# Patient Record
Sex: MALE | Race: WHITE | HISPANIC OR LATINO | Employment: STUDENT | URBAN - METROPOLITAN AREA
[De-identification: names, ages, dates, MRNs, and addresses within clinical notes are randomized per-mention and may not be internally consistent; named-entity substitution may affect disease eponyms.]

---
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
## 2020-10-11 PROBLEM — H60.01: Status: RESOLVED | Noted: 2019-06-05 | Resolved: 2020-10-11

## 2020-10-13 ENCOUNTER — OFFICE VISIT (OUTPATIENT)
Dept: FAMILY MEDICINE CLINIC | Facility: CLINIC | Age: 20
End: 2020-10-13
Payer: COMMERCIAL

## 2020-10-13 VITALS
OXYGEN SATURATION: 96 % | HEIGHT: 68 IN | RESPIRATION RATE: 18 BRPM | BODY MASS INDEX: 23.82 KG/M2 | DIASTOLIC BLOOD PRESSURE: 74 MMHG | TEMPERATURE: 96 F | HEART RATE: 69 BPM | SYSTOLIC BLOOD PRESSURE: 110 MMHG | WEIGHT: 157.2 LBS

## 2020-10-13 DIAGNOSIS — Z13.89 SCREENING FOR CARDIOVASCULAR, RESPIRATORY, AND GENITOURINARY DISEASES: ICD-10-CM

## 2020-10-13 DIAGNOSIS — Z23 IMMUNIZATION DUE: ICD-10-CM

## 2020-10-13 DIAGNOSIS — Z13.1 SCREENING FOR DIABETES MELLITUS (DM): ICD-10-CM

## 2020-10-13 DIAGNOSIS — Z13.6 SCREENING FOR CARDIOVASCULAR, RESPIRATORY, AND GENITOURINARY DISEASES: ICD-10-CM

## 2020-10-13 DIAGNOSIS — Z00.00 HEALTHCARE MAINTENANCE: Primary | ICD-10-CM

## 2020-10-13 DIAGNOSIS — Z13.83 SCREENING FOR CARDIOVASCULAR, RESPIRATORY, AND GENITOURINARY DISEASES: ICD-10-CM

## 2020-10-13 PROCEDURE — 1036F TOBACCO NON-USER: CPT | Performed by: FAMILY MEDICINE

## 2020-10-13 PROCEDURE — 90651 9VHPV VACCINE 2/3 DOSE IM: CPT

## 2020-10-13 PROCEDURE — 3725F SCREEN DEPRESSION PERFORMED: CPT | Performed by: FAMILY MEDICINE

## 2020-10-13 PROCEDURE — 99395 PREV VISIT EST AGE 18-39: CPT | Performed by: FAMILY MEDICINE

## 2020-10-13 PROCEDURE — 90471 IMMUNIZATION ADMIN: CPT

## 2021-06-12 ENCOUNTER — OFFICE VISIT (OUTPATIENT)
Dept: FAMILY MEDICINE CLINIC | Facility: CLINIC | Age: 21
End: 2021-06-12
Payer: COMMERCIAL

## 2021-06-12 VITALS
RESPIRATION RATE: 16 BRPM | TEMPERATURE: 98 F | OXYGEN SATURATION: 99 % | BODY MASS INDEX: 25.16 KG/M2 | DIASTOLIC BLOOD PRESSURE: 72 MMHG | SYSTOLIC BLOOD PRESSURE: 118 MMHG | HEIGHT: 68 IN | HEART RATE: 86 BPM | WEIGHT: 166 LBS

## 2021-06-12 DIAGNOSIS — B34.9 VIRAL INFECTION, UNSPECIFIED: Primary | ICD-10-CM

## 2021-06-12 PROCEDURE — U0005 INFEC AGEN DETEC AMPLI PROBE: HCPCS | Performed by: FAMILY MEDICINE

## 2021-06-12 PROCEDURE — 1036F TOBACCO NON-USER: CPT | Performed by: FAMILY MEDICINE

## 2021-06-12 PROCEDURE — U0003 INFECTIOUS AGENT DETECTION BY NUCLEIC ACID (DNA OR RNA); SEVERE ACUTE RESPIRATORY SYNDROME CORONAVIRUS 2 (SARS-COV-2) (CORONAVIRUS DISEASE [COVID-19]), AMPLIFIED PROBE TECHNIQUE, MAKING USE OF HIGH THROUGHPUT TECHNOLOGIES AS DESCRIBED BY CMS-2020-01-R: HCPCS | Performed by: FAMILY MEDICINE

## 2021-06-12 PROCEDURE — 3725F SCREEN DEPRESSION PERFORMED: CPT | Performed by: FAMILY MEDICINE

## 2021-06-12 PROCEDURE — 99213 OFFICE O/P EST LOW 20 MIN: CPT | Performed by: FAMILY MEDICINE

## 2021-06-12 PROCEDURE — 3008F BODY MASS INDEX DOCD: CPT | Performed by: FAMILY MEDICINE

## 2021-06-12 NOTE — PROGRESS NOTES
Assessment/Plan:    1  Viral infection, unspecified  Comments:  has viral illness and no known exposure to COVID, will check to be sure  He is not vaccinated and works at [employer]  Orders:  -     Novel Coronavirus (Paul Samanoe 79 in Office    advised he needs to stay on quarantine until results available  COVID 19 Vaccine recommended, risks and benefits of vaccine discussed  There are no Patient Instructions on file for this visit  Return if symptoms worsen or fail to improve  Subjective:      Patient ID: Charmayne Begin is a 24 y o  male  Chief Complaint   Patient presents with    Cough     Runny Nose  mz cma        He has a runny nose, sneezing and coughing  It started 2-3 days ago  The following portions of the patient's history were reviewed and updated as appropriate:  past social history    Review of Systems   Constitutional: Negative for fever  Current Outpatient Medications   Medication Sig Dispense Refill    cholecalciferol (VITAMIN D3) 1,000 units tablet Take by mouth daily      Krill Oil 300 MG CAPS Take by mouth daily      Magnesium 250 MG TABS Take 1 tablet by mouth daily       No current facility-administered medications for this visit  Objective:    /72   Pulse 86   Temp 98 °F (36 7 °C)   Resp 16   Ht 5' 7 75" (1 721 m)   Wt 75 3 kg (166 lb)   SpO2 99%   BMI 25 43 kg/m²      Physical Exam  Vitals signs and nursing note reviewed  Constitutional:       Appearance: He is well-developed  HENT:      Head: Normocephalic and atraumatic  Right Ear: Tympanic membrane and external ear normal       Left Ear: Tympanic membrane and external ear normal    Cardiovascular:      Rate and Rhythm: Normal rate and regular rhythm  Heart sounds: Normal heart sounds  No murmur  Pulmonary:      Effort: Pulmonary effort is normal  No respiratory distress  Breath sounds: Normal breath sounds  No wheezing or rales  Musculoskeletal:      Right lower leg: No edema  Left lower leg: No edema               Jackie Sharif DO

## 2021-06-12 NOTE — LETTER
June 12, 2021     Patient: Lee Benjamin   YOB: 2000   Date of Visit: 6/12/2021       To Whom it May Concern:    Lee Benjamin is under my professional care  He was seen in my office on 6/12/2021  Please excuse from work on 6/14/21  If you have any questions or concerns, please don't hesitate to call           Sincerely,          Shankar Antunez DO        CC: No Recipients

## 2021-06-13 LAB — SARS-COV-2 RNA RESP QL NAA+PROBE: NEGATIVE

## 2021-06-14 ENCOUNTER — TELEPHONE (OUTPATIENT)
Dept: FAMILY MEDICINE CLINIC | Facility: CLINIC | Age: 21
End: 2021-06-14

## 2021-06-14 NOTE — TELEPHONE ENCOUNTER
----- Message from Fabien Jalloh DO sent at 6/14/2021  8:10 AM EDT -----  Please call patient to let them know that their COVID test is negative    Thank you,  Fabien Jalloh DO

## 2021-10-02 ENCOUNTER — TELEMEDICINE (OUTPATIENT)
Dept: FAMILY MEDICINE CLINIC | Facility: CLINIC | Age: 21
End: 2021-10-02
Payer: COMMERCIAL

## 2021-10-02 DIAGNOSIS — Z20.822 EXPOSURE TO COVID-19 VIRUS: Primary | ICD-10-CM

## 2021-10-02 PROCEDURE — U0005 INFEC AGEN DETEC AMPLI PROBE: HCPCS | Performed by: FAMILY MEDICINE

## 2021-10-02 PROCEDURE — U0003 INFECTIOUS AGENT DETECTION BY NUCLEIC ACID (DNA OR RNA); SEVERE ACUTE RESPIRATORY SYNDROME CORONAVIRUS 2 (SARS-COV-2) (CORONAVIRUS DISEASE [COVID-19]), AMPLIFIED PROBE TECHNIQUE, MAKING USE OF HIGH THROUGHPUT TECHNOLOGIES AS DESCRIBED BY CMS-2020-01-R: HCPCS | Performed by: FAMILY MEDICINE

## 2021-10-02 PROCEDURE — 99213 OFFICE O/P EST LOW 20 MIN: CPT | Performed by: FAMILY MEDICINE

## 2021-10-02 PROCEDURE — 1036F TOBACCO NON-USER: CPT | Performed by: FAMILY MEDICINE

## 2021-10-04 ENCOUNTER — TELEPHONE (OUTPATIENT)
Dept: FAMILY MEDICINE CLINIC | Facility: CLINIC | Age: 21
End: 2021-10-04

## 2022-12-06 ENCOUNTER — OFFICE VISIT (OUTPATIENT)
Dept: URGENT CARE | Facility: CLINIC | Age: 22
End: 2022-12-06

## 2022-12-06 VITALS
HEIGHT: 68 IN | BODY MASS INDEX: 27.74 KG/M2 | RESPIRATION RATE: 18 BRPM | HEART RATE: 78 BPM | TEMPERATURE: 98.6 F | WEIGHT: 183 LBS | OXYGEN SATURATION: 98 %

## 2022-12-06 DIAGNOSIS — R68.89 FLU-LIKE SYMPTOMS: Primary | ICD-10-CM

## 2022-12-06 NOTE — LETTER
[facility]  [address] [place] [id number] [phone]  Dept: [phone]    December 6, 2022    Patient: Jorge Wallace  YOB: 2000    Jorge Wallace was seen and evaluated at our [facility]  Please note if Covid and Flu tests are negative, they may return to work when fever free for 24 hours without the use of a fever reducing agent  If Covid or Flu test is positive, they may return to work on 12/08/2022, as this is 5 days from the onset of symptoms  Upon return, they must then adhere to strict masking for an additional 5 days      Sincerely,    Derrick Morgan PA-C

## 2022-12-06 NOTE — PROGRESS NOTES
[facility]        NAME: Yas Tran is a 25 y o  male  : 2000    MRN: [id number]  DATE: 2022  TIME: 12:02 PM    Assessment and Plan   Flu-like symptoms [R68 89]  1  Flu-like symptoms  Covid/Flu-Office Collect            Patient Instructions     Patient Instructions   COVID/flu swab performed, results to be in 24 to 48 hours  Recommend continue over-the-counter cough and cold medication, adequate fluid hydration and rest   Discussed isolation/quarantine requirements  Follow up with PCP in 3-5 days  Proceed to  ER if symptoms worsen  Chief Complaint     Chief Complaint   Patient presents with   • Cold Like Symptoms     Coughing, sneezing ,vomited sore throat         History of Present Illness       Patient is a 26-year-old male presenting today with flulike symptoms x3 days  Patient notes of the last few days he has been experiencing some congestion and a cough, reports yesterday he had a couple episodes of emesis and some body aches, is feeling slightly better today compared to onset of initial symptoms, has been taking occasional over-the-counter cold medication which has provided some relief  Notes that he has been around a few individuals who tested positive for the flu  Denies fever, chills, abdominal pain, trouble swallowing, chest tightness, SOB  Review of Systems   Review of Systems   Constitutional: Negative for chills and fever  HENT: Positive for congestion, sinus pressure and sore throat  Negative for ear pain and trouble swallowing  Eyes: Negative for pain  Respiratory: Positive for cough  Negative for chest tightness and shortness of breath  Cardiovascular: Negative for chest pain  Gastrointestinal: Negative for abdominal pain  Musculoskeletal: Positive for myalgias  Skin: Negative for rash  Neurological: Negative for headaches           Current Medications       Current Outpatient Medications:   •  cholecalciferol (VITAMIN D3) 1,000 units tablet, Take by mouth daily (Patient not taking: Reported on 12/6/2022), Disp: , Rfl:   •  Krill Oil 300 MG CAPS, Take by mouth daily (Patient not taking: Reported on 12/6/2022), Disp: , Rfl:   •  Magnesium 250 MG TABS, Take 1 tablet by mouth daily (Patient not taking: Reported on 12/6/2022), Disp: , Rfl:     Current Allergies     Allergies as of 12/06/2022   • (No Known Allergies)            The following portions of the patient's history were reviewed and updated as appropriate: allergies, current medications, past family history, past medical history, past social history, past surgical history and problem list      History reviewed  No pertinent past medical history  History reviewed  No pertinent surgical history  Family History   Problem Relation Age of Onset   • Prostate cancer Family    • No Known Problems Father    • No Known Problems Mother          Medications have been verified  Objective   Pulse 78   Temp 98 6 °F (37 °C)   Resp 18   Ht 5' 8" (1 727 m)   Wt 83 kg (183 lb)   SpO2 98%   BMI 27 83 kg/m²        Physical Exam     Physical Exam  Vitals and nursing note reviewed  Constitutional:       General: He is not in acute distress  Appearance: Normal appearance  He is well-developed  He is not toxic-appearing  HENT:      Head: Normocephalic and atraumatic  Right Ear: Tympanic membrane, ear canal and external ear normal       Left Ear: Tympanic membrane, ear canal and external ear normal       Nose: Congestion present  Mouth/Throat:      Mouth: Mucous membranes are moist       Pharynx: Oropharynx is clear  No oropharyngeal exudate or posterior oropharyngeal erythema  Eyes:      Conjunctiva/sclera: Conjunctivae normal    Cardiovascular:      Rate and Rhythm: Normal rate and regular rhythm  Pulses: Normal pulses  Pulmonary:      Effort: Pulmonary effort is normal       Breath sounds: Normal breath sounds     Musculoskeletal:      Cervical back: Normal range of motion  No tenderness  Lymphadenopathy:      Cervical: No cervical adenopathy  Skin:     General: Skin is warm  Capillary Refill: Capillary refill takes less than 2 seconds  Neurological:      General: No focal deficit present  Mental Status: He is alert and oriented to person, place, and time

## 2022-12-06 NOTE — PATIENT INSTRUCTIONS
COVID/flu swab performed, results to be in 24 to 48 hours  Recommend continue over-the-counter cough and cold medication, adequate fluid hydration and rest   Discussed isolation/quarantine requirements

## 2022-12-07 ENCOUNTER — TELEPHONE (OUTPATIENT)
Dept: URGENT CARE | Facility: CLINIC | Age: 22
End: 2022-12-07

## 2022-12-07 LAB
FLUAV RNA RESP QL NAA+PROBE: POSITIVE
FLUBV RNA RESP QL NAA+PROBE: NEGATIVE
SARS-COV-2 RNA RESP QL NAA+PROBE: NEGATIVE

## 2022-12-07 NOTE — TELEPHONE ENCOUNTER
Patient informed of positive fluid results, recommended continuing supportive care, patient states he is feeling much better, reiterated isolation/quarantine requirements  All patient's questions answered and is agreeable with this plan

## 2023-11-17 ENCOUNTER — OFFICE VISIT (OUTPATIENT)
Dept: FAMILY MEDICINE CLINIC | Facility: CLINIC | Age: 23
End: 2023-11-17
Payer: COMMERCIAL

## 2023-11-17 VITALS
HEART RATE: 95 BPM | BODY MASS INDEX: 29.7 KG/M2 | SYSTOLIC BLOOD PRESSURE: 128 MMHG | DIASTOLIC BLOOD PRESSURE: 72 MMHG | HEIGHT: 68 IN | WEIGHT: 196 LBS | TEMPERATURE: 97.8 F | RESPIRATION RATE: 18 BRPM

## 2023-11-17 DIAGNOSIS — Z13.83 SCREENING FOR CARDIOVASCULAR, RESPIRATORY, AND GENITOURINARY DISEASES: ICD-10-CM

## 2023-11-17 DIAGNOSIS — Z23 IMMUNIZATION DUE: ICD-10-CM

## 2023-11-17 DIAGNOSIS — Z00.00 HEALTHCARE MAINTENANCE: Primary | ICD-10-CM

## 2023-11-17 DIAGNOSIS — Z13.1 SCREENING FOR DIABETES MELLITUS (DM): ICD-10-CM

## 2023-11-17 DIAGNOSIS — Z13.6 SCREENING FOR CARDIOVASCULAR, RESPIRATORY, AND GENITOURINARY DISEASES: ICD-10-CM

## 2023-11-17 DIAGNOSIS — Z13.89 SCREENING FOR CARDIOVASCULAR, RESPIRATORY, AND GENITOURINARY DISEASES: ICD-10-CM

## 2023-11-17 PROCEDURE — 90715 TDAP VACCINE 7 YRS/> IM: CPT

## 2023-11-17 PROCEDURE — 99395 PREV VISIT EST AGE 18-39: CPT | Performed by: FAMILY MEDICINE

## 2023-11-17 PROCEDURE — 90471 IMMUNIZATION ADMIN: CPT

## 2023-11-17 RX ORDER — DIPHENOXYLATE HYDROCHLORIDE AND ATROPINE SULFATE 2.5; .025 MG/1; MG/1
1 TABLET ORAL DAILY
COMMUNITY

## 2023-11-17 NOTE — PROGRESS NOTES
Assessment/Plan:    No problem-specific Assessment & Plan notes found for this encounter. Cpe  Screen labs  Healthy diet and habits discussed    Nonspecific itchiness lower legs, years  suggest oral H1b, aveeno lotion, hypoallergenic detergents, prevent dryness, f/u if no better vs dermatology    You can try a daily anti-histamine such as claritin, zyrtec, allegra or xyzal every day to see if the leg itching issues improve. Diagnoses and all orders for this visit:    Healthcare maintenance    Screening for diabetes mellitus (DM)  -     Comprehensive metabolic panel; Future    Screening for cardiovascular, respiratory, and genitourinary diseases  -     Lipid Panel with Direct LDL reflex; Future    Immunization due  -     TDAP VACCINE GREATER THAN OR EQUAL TO 6YO IM    Other orders  -     multivitamin (THERAGRAN) TABS; Take 1 tablet by mouth daily        Return if symptoms worsen or fail to improve. Subjective:      Patient ID: Kimberly Dior is a 21 y.o. male. Chief Complaint   Patient presents with    Physical Exam     Sas/cma       HPI  Working in [employer] for now  [employer] school  Exercising   Eats healthy  Likes his portions  Not much sugar intake  Some wt gain  Drinks water  Goes to gym about 2-3w    Years  Legs itch  Lower legs  Not arms  No pets  No exposures  Not sure if rashes are self inflicted\    The following portions of the patient's history were reviewed and updated as appropriate: allergies, current medications, past family history, past medical history, past social history, past surgical history and problem list.    Review of Systems   Constitutional:  Negative for chills and fever. Current Outpatient Medications   Medication Sig Dispense Refill    multivitamin (THERAGRAN) TABS Take 1 tablet by mouth daily       No current facility-administered medications for this visit.        Objective:    /72   Pulse 95   Temp 97.8 °F (36.6 °C)   Resp 18   Ht 5' 8" (1.727 m)   Wt 88.9 kg (196 lb)   BMI 29.80 kg/m²        Physical Exam  Vitals and nursing note reviewed. Constitutional:       General: He is not in acute distress. Appearance: He is well-developed. He is not ill-appearing. HENT:      Head: Normocephalic. Right Ear: Tympanic membrane and ear canal normal.      Left Ear: Tympanic membrane and ear canal normal.      Nose: No congestion. Eyes:      General: No scleral icterus. Conjunctiva/sclera: Conjunctivae normal.   Neck:      Vascular: No carotid bruit. Cardiovascular:      Rate and Rhythm: Normal rate and regular rhythm. Heart sounds: No murmur heard. Pulmonary:      Effort: Pulmonary effort is normal. No respiratory distress. Breath sounds: No wheezing. Abdominal:      General: There is no distension. Palpations: Abdomen is soft. Tenderness: There is no abdominal tenderness. Hernia: No hernia is present. Genitourinary:     Penis: Normal.       Testes: Normal.   Musculoskeletal:         General: No deformity. Cervical back: Neck supple. Right lower leg: No edema. Left lower leg: No edema. Lymphadenopathy:      Cervical: No cervical adenopathy. Skin:     General: Skin is warm and dry. Coloration: Skin is not jaundiced or pale. Findings: No erythema, lesion or rash. Comments: Some excoriations lower legs   Neurological:      Mental Status: He is alert. Motor: No weakness. Gait: Gait normal.   Psychiatric:         Mood and Affect: Mood normal.         Behavior: Behavior normal.         Thought Content: Thought content normal.       BMI Counseling: Body mass index is 29.8 kg/m². The BMI is above normal. Nutrition recommendations include decreasing portion sizes and moderation in carbohydrate intake. Exercise recommendations include exercising 3-5 times per week. No pharmacotherapy was ordered. Rationale for BMI follow-up plan is due to patient being overweight or obese.      Depression Screening and Follow-up Plan: Patient was screened for depression during today's encounter. They screened negative with a PHQ-2 score of 0.              Ruben Gastelum DO

## 2023-11-17 NOTE — PATIENT INSTRUCTIONS
You can try a daily anti-histamine such as claritin, zyrtec, allegra or xyzal every day to see if the leg itching issues improve.

## 2023-11-26 ENCOUNTER — OFFICE VISIT (OUTPATIENT)
Dept: URGENT CARE | Facility: CLINIC | Age: 23
End: 2023-11-26
Payer: COMMERCIAL

## 2023-11-26 VITALS
TEMPERATURE: 98.5 F | DIASTOLIC BLOOD PRESSURE: 78 MMHG | WEIGHT: 200 LBS | OXYGEN SATURATION: 99 % | SYSTOLIC BLOOD PRESSURE: 135 MMHG | HEIGHT: 68 IN | HEART RATE: 85 BPM | BODY MASS INDEX: 30.31 KG/M2

## 2023-11-26 DIAGNOSIS — J06.9 ACUTE URI: Primary | ICD-10-CM

## 2023-11-26 LAB
SARS-COV-2 AG UPPER RESP QL IA: NEGATIVE
VALID CONTROL: NORMAL

## 2023-11-26 PROCEDURE — 87811 SARS-COV-2 COVID19 W/OPTIC: CPT | Performed by: PHYSICIAN ASSISTANT

## 2023-11-26 PROCEDURE — 99213 OFFICE O/P EST LOW 20 MIN: CPT | Performed by: PHYSICIAN ASSISTANT

## 2023-11-26 NOTE — PATIENT INSTRUCTIONS
1. Over-the-counter ibuprofen and/or acetaminophen as needed for pain or fever. 2. Oxymetazoline nasal spray 2 sprays in each nostril every 12 hours for no more than the next 5 days as needed for nasal congestion. 3. Over-the-counter guaifenesin as needed for mucus relief. 4. Gargle salt water as needed for sore throat relief. 5. Increase oral fluid consumption. 6. Follow-up with primary care provider in 7 days for any persistent symptoms. 7.  Go to the ER immediately for any significantly worsening symptoms.

## 2023-11-26 NOTE — PROGRESS NOTES
[facility]        NAME: Wilfred Vences is a 21 y.o. male  : 2000    MRN: [id number]  DATE: 2023  TIME: 3:38 PM    Assessment and Plan   Acute URI [J06.9]  1. Acute URI  Poct Covid 19 Rapid Antigen Test            Patient Instructions     1. Over-the-counter ibuprofen and/or acetaminophen as needed for pain or fever. 2. Oxymetazoline nasal spray 2 sprays in each nostril every 12 hours for no more than the next 5 days as needed for nasal congestion. 3. Over-the-counter guaifenesin as needed for mucus relief. 4. Gargle salt water as needed for sore throat relief. 5. Increase oral fluid consumption. 6. Follow-up with primary care provider in 7 days for any persistent symptoms. 7.  Go to the ER immediately for any significantly worsening symptoms. Chief Complaint     Chief Complaint   Patient presents with    Cold Like Symptoms    Cough     Pt is also complaining of sinus congestion. Symptoms started 2 days ago. History of Present Illness       26-year-old male patient with a 2-day history of nasal congestion, runny nose, mild sore throat, cough. Patient does have fatigue. No fever or chills. No headache. Patient does complain of sinus pressure. Patient is concerned about COVID and would like to be tested. Cough  Associated symptoms include rhinorrhea. Pertinent negatives include no chills or fever. Review of Systems   Review of Systems   Constitutional:  Positive for fatigue. Negative for chills and fever. HENT:  Positive for congestion and rhinorrhea. Respiratory:  Positive for cough.           Current Medications       Current Outpatient Medications:     multivitamin (THERAGRAN) TABS, Take 1 tablet by mouth daily, Disp: , Rfl:     Current Allergies     Allergies as of 2023    (No Known Allergies)            The following portions of the patient's history were reviewed and updated as appropriate: allergies, current medications, past family history, past medical history, past social history, past surgical history and problem list.     History reviewed. No pertinent past medical history. History reviewed. No pertinent surgical history. Family History   Problem Relation Age of Onset    No Known Problems Mother     No Known Problems Father     Prostate cancer Family          Medications have been verified. Objective   /78   Pulse 85   Temp 98.5 °F (36.9 °C)   Ht 5' 8" (1.727 m)   Wt 90.7 kg (200 lb)   SpO2 99%   BMI 30.41 kg/m²        Physical Exam     Physical Exam  Vitals and nursing note reviewed. Constitutional:       General: He is not in acute distress. Appearance: Normal appearance. He is not ill-appearing. HENT:      Head: Normocephalic. Right Ear: Tympanic membrane normal.      Left Ear: Tympanic membrane normal.      Nose: Congestion present. No rhinorrhea. Mouth/Throat:      Mouth: Mucous membranes are moist.      Pharynx: Oropharynx is clear. Eyes:      Conjunctiva/sclera: Conjunctivae normal.      Pupils: Pupils are equal, round, and reactive to light. Cardiovascular:      Rate and Rhythm: Normal rate and regular rhythm. Pulses: Normal pulses. Pulmonary:      Effort: Pulmonary effort is normal.      Breath sounds: Normal breath sounds. Abdominal:      Tenderness: There is no abdominal tenderness. Musculoskeletal:         General: Normal range of motion. Cervical back: Normal range of motion and neck supple. Skin:     General: Skin is warm and dry. Capillary Refill: Capillary refill takes less than 2 seconds. Neurological:      Mental Status: He is alert and oriented to person, place, and time.    Psychiatric:         Mood and Affect: Mood normal.         Behavior: Behavior normal.

## 2024-02-21 PROBLEM — Z13.6 SCREENING FOR CARDIOVASCULAR, RESPIRATORY, AND GENITOURINARY DISEASES: Status: RESOLVED | Noted: 2018-09-28 | Resolved: 2024-02-21

## 2024-02-21 PROBLEM — Z13.89 SCREENING FOR CARDIOVASCULAR, RESPIRATORY, AND GENITOURINARY DISEASES: Status: RESOLVED | Noted: 2018-09-28 | Resolved: 2024-02-21

## 2024-02-21 PROBLEM — Z13.1 SCREENING FOR DIABETES MELLITUS (DM): Status: RESOLVED | Noted: 2018-09-28 | Resolved: 2024-02-21

## 2024-02-21 PROBLEM — Z13.83 SCREENING FOR CARDIOVASCULAR, RESPIRATORY, AND GENITOURINARY DISEASES: Status: RESOLVED | Noted: 2018-09-28 | Resolved: 2024-02-21

## 2024-02-21 PROBLEM — Z00.00 HEALTHCARE MAINTENANCE: Status: RESOLVED | Noted: 2018-09-28 | Resolved: 2024-02-21

## 2025-03-03 ENCOUNTER — OFFICE VISIT (OUTPATIENT)
Dept: FAMILY MEDICINE CLINIC | Facility: CLINIC | Age: 25
End: 2025-03-03
Payer: COMMERCIAL

## 2025-03-03 VITALS
HEART RATE: 72 BPM | BODY MASS INDEX: 29.95 KG/M2 | DIASTOLIC BLOOD PRESSURE: 76 MMHG | RESPIRATION RATE: 18 BRPM | SYSTOLIC BLOOD PRESSURE: 132 MMHG | HEIGHT: 69 IN | WEIGHT: 202.2 LBS | TEMPERATURE: 98.7 F

## 2025-03-03 DIAGNOSIS — Z13.6 SCREENING FOR CARDIOVASCULAR, RESPIRATORY, AND GENITOURINARY DISEASES: ICD-10-CM

## 2025-03-03 DIAGNOSIS — E66.9 OBESITY (BMI 30-39.9): ICD-10-CM

## 2025-03-03 DIAGNOSIS — R35.0 URINARY FREQUENCY: ICD-10-CM

## 2025-03-03 DIAGNOSIS — L74.8 FOOT ODOR: ICD-10-CM

## 2025-03-03 DIAGNOSIS — Z13.1 SCREENING FOR DIABETES MELLITUS (DM): ICD-10-CM

## 2025-03-03 DIAGNOSIS — Z13.89 SCREENING FOR CARDIOVASCULAR, RESPIRATORY, AND GENITOURINARY DISEASES: ICD-10-CM

## 2025-03-03 DIAGNOSIS — Z13.29 SCREENING FOR THYROID DISORDER: ICD-10-CM

## 2025-03-03 DIAGNOSIS — Z00.00 HEALTHCARE MAINTENANCE: Primary | ICD-10-CM

## 2025-03-03 DIAGNOSIS — Z13.83 SCREENING FOR CARDIOVASCULAR, RESPIRATORY, AND GENITOURINARY DISEASES: ICD-10-CM

## 2025-03-03 PROCEDURE — 99395 PREV VISIT EST AGE 18-39: CPT | Performed by: FAMILY MEDICINE

## 2025-03-03 NOTE — PROGRESS NOTES
"Name: Amena Underwood      : 2000      MRN: [id number]  Encounter Provider: Anthony Zuniga DO  Encounter Date: 3/3/2025   Encounter department: [facility]  :  Assessment & Plan  Healthcare maintenance  Labs, diet, exercise, wt loss       Screening for cardiovascular, respiratory, and genitourinary diseases  labs  Orders:    Lipid Panel with Direct LDL reflex; Future    Screening for diabetes mellitus (DM)  labs  Orders:    Comprehensive metabolic panel; Future    Screening for thyroid disorder  labs  Orders:    TSH, 3rd generation; Future    Urinary frequency  Bladder outlet issue?  Avoid stimulants, caffeine, antihistamines  Possible urology next  Orders:    US bladder with post void residual; Future    Foot odor  No rash  Trial wash with hibiclens after shower       Obesity (BMI 30-39.9)           BMI 30.0-30.9,adult              History of Present Illness   HPI  In school, associates program in [employer]  Thinking [facility] next  Part time work at [employer]  Eating healthy  Gym but not consistently going  Water 32oz/d    Notes more feet odor lately  Washes them well  Notes at end of day  No rashes or itc  Not whole body    Gained about 50# in past 5y    Feels he has urine freq   No caffeine or H1b or sudafed  Hesitancy per pt  About 1 year of symptoms  No burning  Feels need to urinate shortly after going    Review of Systems   Constitutional:  Positive for unexpected weight change. Negative for chills and fever.   Respiratory:  Negative for shortness of breath.    Cardiovascular:  Negative for chest pain.   Gastrointestinal:  Negative for abdominal pain.       Objective   /76   Pulse 72   Temp 98.7 °F (37.1 °C)   Resp 18   Ht 5' 8.5\" (1.74 m)   Wt 91.7 kg (202 lb 3.2 oz)   BMI 30.30 kg/m²      Physical Exam  Vitals and nursing note reviewed.   Constitutional:       General: He is not in acute distress.     Appearance: He is well-developed. He is not ill-appearing.   HENT:      Head: " Normocephalic.      Right Ear: Tympanic membrane and ear canal normal.      Left Ear: Tympanic membrane and ear canal normal.      Nose: No congestion.   Eyes:      General: No scleral icterus.     Conjunctiva/sclera: Conjunctivae normal.   Cardiovascular:      Rate and Rhythm: Normal rate and regular rhythm.      Heart sounds: No murmur heard.  Pulmonary:      Effort: Pulmonary effort is normal. No respiratory distress.      Breath sounds: No wheezing.   Abdominal:      General: There is no distension.      Palpations: Abdomen is soft.      Tenderness: There is no abdominal tenderness. There is no guarding or rebound.      Hernia: No hernia is present.   Genitourinary:     Penis: Normal.       Testes: Normal.   Musculoskeletal:         General: No deformity.      Cervical back: Neck supple.      Right lower leg: No edema.      Left lower leg: No edema.   Skin:     General: Skin is warm and dry.      Coloration: Skin is not jaundiced or pale.   Neurological:      Mental Status: He is alert.      Motor: No weakness.      Gait: Gait normal.   Psychiatric:         Mood and Affect: Mood normal.         Behavior: Behavior normal.         Thought Content: Thought content normal.

## 2025-03-04 NOTE — ASSESSMENT & PLAN NOTE
Bladder outlet issue?  Avoid stimulants, caffeine, antihistamines  Possible urology next  Orders:    US bladder with post void residual; Future

## 2025-07-03 ENCOUNTER — OFFICE VISIT (OUTPATIENT)
Dept: URGENT CARE | Facility: CLINIC | Age: 25
End: 2025-07-03
Payer: COMMERCIAL

## 2025-07-03 VITALS
HEIGHT: 68 IN | SYSTOLIC BLOOD PRESSURE: 124 MMHG | OXYGEN SATURATION: 97 % | HEART RATE: 88 BPM | DIASTOLIC BLOOD PRESSURE: 74 MMHG | TEMPERATURE: 97.5 F | BODY MASS INDEX: 30.31 KG/M2 | RESPIRATION RATE: 18 BRPM | WEIGHT: 200 LBS

## 2025-07-03 DIAGNOSIS — J06.9 VIRAL UPPER RESPIRATORY ILLNESS: Primary | ICD-10-CM

## 2025-07-03 PROCEDURE — 99213 OFFICE O/P EST LOW 20 MIN: CPT

## 2025-07-03 RX ORDER — BENZONATATE 200 MG/1
200 CAPSULE ORAL 3 TIMES DAILY PRN
Qty: 20 CAPSULE | Refills: 0 | Status: SHIPPED | OUTPATIENT
Start: 2025-07-03

## 2025-07-03 RX ORDER — ALBUTEROL SULFATE 90 UG/1
2 INHALANT RESPIRATORY (INHALATION) EVERY 6 HOURS PRN
Qty: 8.5 G | Refills: 0 | Status: SHIPPED | OUTPATIENT
Start: 2025-07-03

## 2025-07-03 NOTE — PROGRESS NOTES
[facility]  Name: Amena Underwood      : 2000      MRN: [id number]  Encounter Provider: MATHEW Mitchell  Encounter Date: 7/3/2025   Encounter department: [facility]  :  Assessment & Plan  Viral upper respiratory illness  Recommend Mucinex during the day as needed for cough.  Recommend Flonase daily.  Trial Tessalon every 8 hours as needed for cough.  Trial albuterol every 6 hours as needed for chest tightness and shortness of breath.           Patient Instructions  Follow up with PCP in 3-5 days.  Proceed to  ER if symptoms worsen.    If tests are performed, our office will contact you with results only if changes need to made to the care plan discussed with you at the visit. You can review your full results on [facility].    Chief Complaint:   Chief Complaint   Patient presents with    Cough     Cough and congestion for about a week. Now has chest pain when coughing. Taking otc cough and cold medicine     History of Present Illness   25-year-old male presents for evaluation of cold symptoms.  Over the past 4 days patient has been experiencing congestion, sore throat, cough, chest tightness and chest pain.  He notes that the chest pain and back pain with coughing started earlier today.  He has been using over-the-counter cough and cold medication with minimal relief.    Cough  Associated symptoms include chest pain and a sore throat. Pertinent negatives include no chills, fever or shortness of breath.         Review of Systems   Constitutional:  Negative for chills and fever.   HENT:  Positive for congestion and sore throat.    Respiratory:  Positive for cough and chest tightness. Negative for shortness of breath.    Cardiovascular:  Positive for chest pain.   Gastrointestinal:  Negative for diarrhea, nausea and vomiting.     Past Medical History   Past Medical History[1]  Past Surgical History[2]  Family History[3]  he reports that he has never smoked. He has  "never been exposed to tobacco smoke. He has never used smokeless tobacco. He reports current alcohol use. He reports that he does not use drugs.  Current Outpatient Medications   Medication Instructions    multivitamin (THERAGRAN) TABS 1 tablet, Daily   Allergies[4]     Objective   /74   Pulse 88   Temp 97.5 °F (36.4 °C)   Resp 18   Ht 5' 8\" (1.727 m)   Wt 90.7 kg (200 lb)   SpO2 97%   BMI 30.41 kg/m²      Physical Exam  Vitals and nursing note reviewed.   Constitutional:       General: He is not in acute distress.     Appearance: He is well-developed.   HENT:      Head: Normocephalic and atraumatic.      Nose: Congestion present.      Mouth/Throat:      Pharynx: Posterior oropharyngeal erythema present.     Eyes:      Conjunctiva/sclera: Conjunctivae normal.       Cardiovascular:      Rate and Rhythm: Normal rate and regular rhythm.      Heart sounds: No murmur heard.  Pulmonary:      Effort: Pulmonary effort is normal. No respiratory distress.      Breath sounds: Normal breath sounds.   Abdominal:      Palpations: Abdomen is soft.      Tenderness: There is no abdominal tenderness.     Musculoskeletal:         General: No swelling.      Cervical back: Neck supple.     Skin:     General: Skin is warm and dry.      Capillary Refill: Capillary refill takes less than 2 seconds.     Neurological:      Mental Status: He is alert.     Psychiatric:         Mood and Affect: Mood normal.         Portions of the record may have been created with voice recognition software.  Occasional wrong word or \"sound a like\" substitutions may have occurred due to the inherent limitations of voice recognition software.  Read the chart carefully and recognize, using context, where substitutions have occurred.         [1] No past medical history on file.  [2] No past surgical history on file.  [3]   Family History  Problem Relation Name Age of Onset    No Known Problems Mother      No Known Problems Father      Prostate cancer " Family Unspec    [4] No Known Allergies

## 2025-07-03 NOTE — LETTER
July 3, 2025     Patient: Amena Underwood   YOB: 2000   Date of Visit: 7/3/2025       To Whom it May Concern:    Amena Underwood was seen in my clinic on 7/3/2025. He may be excused from work on 7/3/2025.    If you have any questions or concerns, please don't hesitate to call.         Sincerely,          MATHEW Mitchell        CC: No Recipients

## 2025-07-03 NOTE — PATIENT INSTRUCTIONS
Recommend Mucinex during the day as needed for cough.  Recommend Flonase daily.  Trial Tessalon every 8 hours as needed for cough.  Trial albuterol every 6 hours as needed for chest tightness and shortness of breath.